# Patient Record
Sex: FEMALE | Race: BLACK OR AFRICAN AMERICAN | NOT HISPANIC OR LATINO | Employment: UNEMPLOYED | URBAN - NONMETROPOLITAN AREA
[De-identification: names, ages, dates, MRNs, and addresses within clinical notes are randomized per-mention and may not be internally consistent; named-entity substitution may affect disease eponyms.]

---

## 2022-08-29 ENCOUNTER — HISTORICAL (OUTPATIENT)
Dept: ADMINISTRATIVE | Facility: HOSPITAL | Age: 22
End: 2022-08-29

## 2022-09-10 ENCOUNTER — HOSPITAL ENCOUNTER (EMERGENCY)
Facility: HOSPITAL | Age: 22
Discharge: HOME | End: 2022-09-10
Attending: STUDENT IN AN ORGANIZED HEALTH CARE EDUCATION/TRAINING PROGRAM
Payer: MEDICAID

## 2022-09-10 VITALS
HEART RATE: 75 BPM | BODY MASS INDEX: 28.6 KG/M2 | RESPIRATION RATE: 16 BRPM | DIASTOLIC BLOOD PRESSURE: 81 MMHG | WEIGHT: 167.56 LBS | SYSTOLIC BLOOD PRESSURE: 125 MMHG | OXYGEN SATURATION: 99 % | HEIGHT: 64 IN

## 2022-09-10 DIAGNOSIS — O20.9 VAGINAL BLEEDING AFFECTING EARLY PREGNANCY: ICD-10-CM

## 2022-09-10 DIAGNOSIS — O20.0 THREATENED MISCARRIAGE IN EARLY PREGNANCY: Primary | ICD-10-CM

## 2022-09-10 LAB
ABORH RETYPE: NORMAL
ALBUMIN SERPL-MCNC: 3.9 GM/DL (ref 3.5–5)
ALBUMIN/GLOB SERPL: 1.4 RATIO (ref 1.1–2)
ALP SERPL-CCNC: 58 UNIT/L (ref 40–150)
ALT SERPL-CCNC: 13 UNIT/L (ref 0–55)
APPEARANCE UR: ABNORMAL
AST SERPL-CCNC: 17 UNIT/L (ref 5–34)
B-HCG FREE SERPL-ACNC: ABNORMAL MIU/ML
BACTERIA #/AREA URNS AUTO: ABNORMAL /HPF
BASOPHILS # BLD AUTO: 0.02 X10(3)/MCL (ref 0–0.2)
BASOPHILS NFR BLD AUTO: 0.2 %
BILIRUB UR QL STRIP.AUTO: NEGATIVE MG/DL
BILIRUBIN DIRECT+TOT PNL SERPL-MCNC: 0.3 MG/DL
BUN SERPL-MCNC: 4 MG/DL (ref 7–18.7)
CALCIUM SERPL-MCNC: 9.2 MG/DL (ref 8.4–10.2)
CHLORIDE SERPL-SCNC: 107 MMOL/L (ref 98–107)
CO2 SERPL-SCNC: 23 MMOL/L (ref 22–29)
COLOR UR AUTO: YELLOW
CREAT SERPL-MCNC: 0.63 MG/DL (ref 0.55–1.02)
EOSINOPHIL # BLD AUTO: 0.32 X10(3)/MCL (ref 0–0.9)
EOSINOPHIL NFR BLD AUTO: 3.2 %
ERYTHROCYTE [DISTWIDTH] IN BLOOD BY AUTOMATED COUNT: 13.8 % (ref 11.5–17)
GFR SERPLBLD CREATININE-BSD FMLA CKD-EPI: >60 MLS/MIN/1.73/M2
GLOBULIN SER-MCNC: 2.7 GM/DL (ref 2.4–3.5)
GLUCOSE SERPL-MCNC: 62 MG/DL (ref 74–100)
GLUCOSE UR QL STRIP.AUTO: NEGATIVE MG/DL
GROUP & RH: NORMAL
HCT VFR BLD AUTO: 35.6 % (ref 37–47)
HGB BLD-MCNC: 12.3 GM/DL (ref 12–16)
IMM GRANULOCYTES # BLD AUTO: 0.04 X10(3)/MCL (ref 0–0.04)
IMM GRANULOCYTES NFR BLD AUTO: 0.4 %
INDIRECT COOMBS GEL: NORMAL
KETONES UR QL STRIP.AUTO: NEGATIVE MG/DL
LEUKOCYTE ESTERASE UR QL STRIP.AUTO: NEGATIVE UNIT/L
LYMPHOCYTES # BLD AUTO: 2.32 X10(3)/MCL (ref 0.6–4.6)
LYMPHOCYTES NFR BLD AUTO: 23.2 %
MCH RBC QN AUTO: 30.9 PG (ref 27–31)
MCHC RBC AUTO-ENTMCNC: 34.6 MG/DL (ref 33–36)
MCV RBC AUTO: 89.4 FL (ref 80–94)
MONOCYTES # BLD AUTO: 0.69 X10(3)/MCL (ref 0.1–1.3)
MONOCYTES NFR BLD AUTO: 6.9 %
MUCOUS THREADS URNS QL MICRO: ABNORMAL /LPF
NEUTROPHILS # BLD AUTO: 6.6 X10(3)/MCL (ref 2.1–9.2)
NEUTROPHILS NFR BLD AUTO: 66.1 %
NITRITE UR QL STRIP.AUTO: NEGATIVE
PH UR STRIP.AUTO: 6 [PH]
PLATELET # BLD AUTO: 209 X10(3)/MCL (ref 130–400)
PMV BLD AUTO: 10.3 FL (ref 7.4–10.4)
POTASSIUM SERPL-SCNC: 3.6 MMOL/L (ref 3.5–5.1)
PROT SERPL-MCNC: 6.6 GM/DL (ref 6.4–8.3)
PROT UR QL STRIP.AUTO: NEGATIVE MG/DL
RBC # BLD AUTO: 3.98 X10(6)/MCL (ref 4.2–5.4)
RBC #/AREA URNS AUTO: ABNORMAL /HPF
RBC UR QL AUTO: ABNORMAL UNIT/L
SODIUM SERPL-SCNC: 138 MMOL/L (ref 136–145)
SP GR UR STRIP.AUTO: >=1.03
SQUAMOUS #/AREA URNS AUTO: ABNORMAL /HPF
UROBILINOGEN UR STRIP-ACNC: 1 MG/DL
WBC # SPEC AUTO: 10 X10(3)/MCL (ref 4.5–11.5)
WBC #/AREA URNS AUTO: ABNORMAL /HPF

## 2022-09-10 PROCEDURE — 86901 BLOOD TYPING SEROLOGIC RH(D): CPT | Performed by: STUDENT IN AN ORGANIZED HEALTH CARE EDUCATION/TRAINING PROGRAM

## 2022-09-10 PROCEDURE — 81001 URINALYSIS AUTO W/SCOPE: CPT | Performed by: STUDENT IN AN ORGANIZED HEALTH CARE EDUCATION/TRAINING PROGRAM

## 2022-09-10 PROCEDURE — 80053 COMPREHEN METABOLIC PANEL: CPT | Performed by: STUDENT IN AN ORGANIZED HEALTH CARE EDUCATION/TRAINING PROGRAM

## 2022-09-10 PROCEDURE — 99284 EMERGENCY DEPT VISIT MOD MDM: CPT | Mod: 25

## 2022-09-10 PROCEDURE — 85025 COMPLETE CBC W/AUTO DIFF WBC: CPT | Performed by: STUDENT IN AN ORGANIZED HEALTH CARE EDUCATION/TRAINING PROGRAM

## 2022-09-10 PROCEDURE — 84702 CHORIONIC GONADOTROPIN TEST: CPT | Performed by: STUDENT IN AN ORGANIZED HEALTH CARE EDUCATION/TRAINING PROGRAM

## 2022-09-10 PROCEDURE — 36415 COLL VENOUS BLD VENIPUNCTURE: CPT | Performed by: STUDENT IN AN ORGANIZED HEALTH CARE EDUCATION/TRAINING PROGRAM

## 2022-09-11 NOTE — ED PROVIDER NOTES
Encounter Date: 9/10/2022       History     Chief Complaint   Patient presents with    Vaginal Bleeding     Pt states she is 7 weeks OB and is having spotting      HPI    This is a 22-year-old  at approximately 7 weeks and 3 days gestational age from LMP presents emergency department for vaginal spotting.  Patient states that she started having having abdominal cramping 2-3 days ago then noticed spotting today.  States that this is very similar to the last 2 miscarriages.  Patient states she thinks her blood type is O-positive.  No fevers or chills.  No abdominal pain other than some cramping like a menstrual cycle.    Review of patient's allergies indicates:   Allergen Reactions    Aspirin Hives    Ibuprofen Hives     No past medical history on file.  No past surgical history on file.  No family history on file.     Review of Systems   Constitutional:  Negative for fever.   Respiratory:  Negative for cough and shortness of breath.    Cardiovascular:  Negative for chest pain.   Gastrointestinal:  Negative for abdominal pain.   Genitourinary:  Positive for pelvic pain and vaginal bleeding.   All other systems reviewed and are negative.    Physical Exam     Initial Vitals [09/10/22 1908]   BP Pulse Resp Temp SpO2   120/74 87 16 -- 100 %      MAP       --         Physical Exam    Nursing note and vitals reviewed.  Constitutional: She appears well-developed and well-nourished. No distress.   Cardiovascular:  Normal rate and regular rhythm.           Pulmonary/Chest: Breath sounds normal. No respiratory distress.   Abdominal: Abdomen is soft. Bowel sounds are normal. There is no abdominal tenderness.   Musculoskeletal:         General: No tenderness. Normal range of motion.     Neurological: She is alert and oriented to person, place, and time.   Skin: Skin is warm. Capillary refill takes less than 2 seconds.   Psychiatric: She has a normal mood and affect. Thought content normal.       ED Course    Procedures  Labs Reviewed   URINALYSIS, REFLEX TO URINE CULTURE - Abnormal; Notable for the following components:       Result Value    Appearance, UA Slightly Cloudy (*)     Blood, UA Trace-Intact (*)     All other components within normal limits   COMPREHENSIVE METABOLIC PANEL - Abnormal; Notable for the following components:    Glucose Level 62 (*)     Blood Urea Nitrogen 4.0 (*)     All other components within normal limits   HCG, QUANTITATIVE - Abnormal; Notable for the following components:    Beta Human Chorionic Gonadotropin Quantitative 158,835.69 (*)     All other components within normal limits   CBC WITH DIFFERENTIAL - Abnormal; Notable for the following components:    RBC 3.98 (*)     Hct 35.6 (*)     All other components within normal limits   URINALYSIS, MICROSCOPIC - Abnormal; Notable for the following components:    Mucous, UA Trace (*)     Squamous Epithelial Cells, UA Few (*)     All other components within normal limits   CBC W/ AUTO DIFFERENTIAL    Narrative:     The following orders were created for panel order CBC auto differential.  Procedure                               Abnormality         Status                     ---------                               -----------         ------                     CBC with Differential[[id number]]        Abnormal            Final result                 Please view results for these tests on the individual orders.   TYPE & SCREEN   ABORH RETYPE          Imaging Results              US OB <14 Wks TransAbd & TransVag, Single Gestation (XPD) (Preliminary result)  Result time 09/10/22 21:26:17   Procedure changed from US OB <14 Wks, TransAbd, Single Gestation     Preliminary result by Jw Lindsay MD (09/10/22 21:26:17)                   Narrative:    START OF REPORT:  Technique: First trimester ultrasound of the pelvis was performed with transabdominal and transvaginal images being obtained.    Comparison: None.    Clinical history: Vaginal spotting.  tech impression final image.    FINDINGS:  Uterus: The uterus measures 9.2 x 4.9 x 5.8 cm.  Intrauterine gestation: A gravid uterus is present with a gestational sac and fetal pole identified with a fetal heart rate of 157 bpm.  Fetus: The crown rump length measures 1.11 cm which corresponds to 7 weeks 2 days gestational age by CRL criteria.  Gestational Sac: The gestational sac measures 3.4 cm which corresponds to a gestational age of 8 weeks 4 days.  Gestational age: The gestational age by LMP is 7 weeks 3 days. The gestational age by ultrasound criteria is 8 weeks 0 days. This corresponds to an ultrasound estimated date of confinement of 04/22/2023.  Placenta: The placenta is forming posterior. An irregular heterogenous structure measuring 8 x 8 mm is seen in the gestational sac adjacent to the placenta.    Adnexa:  Right Ovary: The right ovary measures 2.2 x 2.3 x 2.4 cm.  Left Ovary: The left ovary measures 2.1 x 2.4 x 3.6 cm.    Fluid: No free fluid is seen in the pelvis.      Impression:  1. An irregular heterogenous structure measuring 8 x 8 mm is seen in the gestational sac adjacent to the placenta. Correlate clinically as regards additional evaluation and follow up.  2. The gestational age by ultrasound criteria is 8 weeks 0 days. This corresponds to an ultrasound estimated date of confinement of 04/22/2023.  3. A gravid uterus is present with a gestational sac and fetal pole identified with a fetal heart rate of 157 bpm.  4. Details and other findings as above.                          Preliminary result by [employer], Rad Results In (09/10/22 21:26:17)                   Narrative:    START OF REPORT:  Technique: First trimester ultrasound of the pelvis was performed with transabdominal and transvaginal images being obtained.    Comparison: None.    Clinical history: Vaginal spotting. tech impression final image.    FINDINGS:  Uterus: The uterus measures 9.2 x 4.9 x 5.8 cm.  Intrauterine gestation: A  gravid uterus is present with a gestational sac and fetal pole identified with a fetal heart rate of 157 bpm.  Fetus: The crown rump length measures 1.11 cm which corresponds to 7 weeks 2 days gestational age by CRL criteria.  Gestational Sac: The gestational sac measures 3.4 cm which corresponds to a gestational age of 8 weeks 4 days.  Gestational age: The gestational age by LMP is 7 weeks 3 days. The gestational age by ultrasound criteria is 8 weeks 0 days. This corresponds to an ultrasound estimated date of confinement of 2023.  Placenta: The placenta is forming posterior. An irregular heterogenous structure measuring 8 x 8 mm is seen in the gestational sac adjacent to the placenta.    Adnexa:  Right Ovary: The right ovary measures 2.2 x 2.3 x 2.4 cm.  Left Ovary: The left ovary measures 2.1 x 2.4 x 3.6 cm.    Fluid: No free fluid is seen in the pelvis.      Impression:  1. An irregular heterogenous structure measuring 8 x 8 mm is seen in the gestational sac adjacent to the placenta. Correlate clinically as regards additional evaluation and follow up.  2. The gestational age by ultrasound criteria is 8 weeks 0 days. This corresponds to an ultrasound estimated date of confinement of 2023.  3. A gravid uterus is present with a gestational sac and fetal pole identified with a fetal heart rate of 157 bpm.  4. Details and other findings as above.                                         Medications - No data to display  Medical Decision Making:   Differential Diagnosis:   Implantation bleeding, threatened , miscarriage, UTI           ED Course as of 09/10/22 2233   Sat Sep 10, 2022   2017 Glucose(!): 62  Patient was given juice [BS]    ABO and RH: O POS [BS]   2215 I informed patient that this is possibly a molar pregnancy with a normal pregnancy.  She understands that she needs to follow-up with OB 1st thing on Monday. [BS]      ED Course User Index  [BS] Luther Blackwood MD              Clinical Impression:   Final diagnoses:  [O20.9] Vaginal bleeding affecting early pregnancy  [O20.0] Threatened miscarriage in early pregnancy (Primary)        ED Disposition Condition    Discharge Stable          ED Prescriptions    None       Follow-up Information       Follow up With Specialties Details Why Contact Info    [facility] - Emergency Dept Emergency Medicine Go to  If symptoms worsen [id number]  [facility] [address]  [phone]    Follow-up with OB                 Luther Blackwood MD  09/10/22 2204       Luther Blackwood MD  09/10/22 0096

## 2022-10-19 ENCOUNTER — HISTORICAL (OUTPATIENT)
Dept: ADMINISTRATIVE | Facility: HOSPITAL | Age: 22
End: 2022-10-19

## 2023-06-20 ENCOUNTER — PATIENT MESSAGE (OUTPATIENT)
Dept: RESEARCH | Facility: HOSPITAL | Age: 23
End: 2023-06-20
Payer: MEDICAID